# Patient Record
Sex: MALE | Race: OTHER | Employment: UNEMPLOYED | URBAN - METROPOLITAN AREA
[De-identification: names, ages, dates, MRNs, and addresses within clinical notes are randomized per-mention and may not be internally consistent; named-entity substitution may affect disease eponyms.]

---

## 2022-07-28 ENCOUNTER — ANESTHESIA EVENT (OUTPATIENT)
Dept: OPERATING ROOM | Age: 37
End: 2022-07-28

## 2022-07-28 ENCOUNTER — APPOINTMENT (OUTPATIENT)
Dept: CT IMAGING | Age: 37
End: 2022-07-28

## 2022-07-28 ENCOUNTER — ANESTHESIA (OUTPATIENT)
Dept: OPERATING ROOM | Age: 37
End: 2022-07-28

## 2022-07-28 ENCOUNTER — HOSPITAL ENCOUNTER (OUTPATIENT)
Age: 37
Setting detail: OBSERVATION
Discharge: HOME | End: 2022-07-29
Attending: SURGERY | Admitting: SURGERY

## 2022-07-28 DIAGNOSIS — K35.20 ACUTE APPENDICITIS WITH GENERALIZED PERITONITIS, WITHOUT GANGRENE OR ABSCESS, UNSPECIFIED WHETHER PERFORATION PRESENT: Primary | ICD-10-CM

## 2022-07-28 DIAGNOSIS — K37 APPENDICITIS, UNSPECIFIED APPENDICITIS TYPE: ICD-10-CM

## 2022-07-28 PROBLEM — K35.80 ACUTE APPENDICITIS: Status: ACTIVE | Noted: 2022-07-28

## 2022-07-28 LAB
A/G RATIO: 1.6 (ref 1.1–2.2)
ALBUMIN SERPL-MCNC: 4.9 G/DL (ref 3.4–5)
ALP BLD-CCNC: 105 U/L (ref 40–129)
ALT SERPL-CCNC: 30 U/L (ref 10–40)
ANION GAP SERPL CALCULATED.3IONS-SCNC: 9 MMOL/L (ref 3–16)
AST SERPL-CCNC: 23 U/L (ref 15–37)
BASOPHILS ABSOLUTE: 0.1 K/UL (ref 0–0.2)
BASOPHILS RELATIVE PERCENT: 0.5 %
BILIRUB SERPL-MCNC: 0.3 MG/DL (ref 0–1)
BILIRUBIN URINE: NEGATIVE
BLOOD, URINE: NEGATIVE
BUN BLDV-MCNC: 19 MG/DL (ref 7–20)
CALCIUM SERPL-MCNC: 9.7 MG/DL (ref 8.3–10.6)
CHLORIDE BLD-SCNC: 100 MMOL/L (ref 99–110)
CLARITY: CLEAR
CO2: 28 MMOL/L (ref 21–32)
COLOR: YELLOW
CREAT SERPL-MCNC: 1 MG/DL (ref 0.9–1.3)
EOSINOPHILS ABSOLUTE: 0 K/UL (ref 0–0.6)
EOSINOPHILS RELATIVE PERCENT: 0.3 %
GFR AFRICAN AMERICAN: >60
GFR NON-AFRICAN AMERICAN: >60
GLUCOSE BLD-MCNC: 143 MG/DL (ref 70–99)
GLUCOSE URINE: NEGATIVE MG/DL
HCT VFR BLD CALC: 44 % (ref 40.5–52.5)
HEMOGLOBIN: 14.7 G/DL (ref 13.5–17.5)
KETONES, URINE: NEGATIVE MG/DL
LEUKOCYTE ESTERASE, URINE: NEGATIVE
LIPASE: 21 U/L (ref 13–60)
LYMPHOCYTES ABSOLUTE: 1.8 K/UL (ref 1–5.1)
LYMPHOCYTES RELATIVE PERCENT: 10.5 %
MCH RBC QN AUTO: 30.7 PG (ref 26–34)
MCHC RBC AUTO-ENTMCNC: 33.5 G/DL (ref 31–36)
MCV RBC AUTO: 91.6 FL (ref 80–100)
MICROSCOPIC EXAMINATION: NORMAL
MONOCYTES ABSOLUTE: 0.9 K/UL (ref 0–1.3)
MONOCYTES RELATIVE PERCENT: 5.4 %
NEUTROPHILS ABSOLUTE: 14.6 K/UL (ref 1.7–7.7)
NEUTROPHILS RELATIVE PERCENT: 83.3 %
NITRITE, URINE: NEGATIVE
PDW BLD-RTO: 13.5 % (ref 12.4–15.4)
PH UA: 6.5 (ref 5–8)
PLATELET # BLD: 268 K/UL (ref 135–450)
PMV BLD AUTO: 7.6 FL (ref 5–10.5)
POTASSIUM SERPL-SCNC: 3.9 MMOL/L (ref 3.5–5.1)
PROTEIN UA: NEGATIVE MG/DL
RBC # BLD: 4.8 M/UL (ref 4.2–5.9)
SODIUM BLD-SCNC: 137 MMOL/L (ref 136–145)
SPECIFIC GRAVITY UA: >=1.03 (ref 1–1.03)
TOTAL PROTEIN: 7.9 G/DL (ref 6.4–8.2)
URINE REFLEX TO CULTURE: NORMAL
URINE TYPE: NORMAL
UROBILINOGEN, URINE: 0.2 E.U./DL
WBC # BLD: 17.5 K/UL (ref 4–11)

## 2022-07-28 PROCEDURE — 99285 EMERGENCY DEPT VISIT HI MDM: CPT

## 2022-07-28 PROCEDURE — 3700000001 HC ADD 15 MINUTES (ANESTHESIA): Performed by: SURGERY

## 2022-07-28 PROCEDURE — 96376 TX/PRO/DX INJ SAME DRUG ADON: CPT

## 2022-07-28 PROCEDURE — 2720000010 HC SURG SUPPLY STERILE: Performed by: SURGERY

## 2022-07-28 PROCEDURE — 99219 PR INITIAL OBSERVATION CARE/DAY 50 MINUTES: CPT | Performed by: SURGERY

## 2022-07-28 PROCEDURE — 7100000001 HC PACU RECOVERY - ADDTL 15 MIN: Performed by: SURGERY

## 2022-07-28 PROCEDURE — 3700000000 HC ANESTHESIA ATTENDED CARE: Performed by: SURGERY

## 2022-07-28 PROCEDURE — 6370000000 HC RX 637 (ALT 250 FOR IP): Performed by: PHYSICIAN ASSISTANT

## 2022-07-28 PROCEDURE — 6370000000 HC RX 637 (ALT 250 FOR IP): Performed by: SURGERY

## 2022-07-28 PROCEDURE — 80053 COMPREHEN METABOLIC PANEL: CPT

## 2022-07-28 PROCEDURE — 3600000014 HC SURGERY LEVEL 4 ADDTL 15MIN: Performed by: SURGERY

## 2022-07-28 PROCEDURE — 74177 CT ABD & PELVIS W/CONTRAST: CPT

## 2022-07-28 PROCEDURE — 96372 THER/PROPH/DIAG INJ SC/IM: CPT

## 2022-07-28 PROCEDURE — 6360000002 HC RX W HCPCS: Performed by: ANESTHESIOLOGY

## 2022-07-28 PROCEDURE — 6360000004 HC RX CONTRAST MEDICATION: Performed by: PHYSICIAN ASSISTANT

## 2022-07-28 PROCEDURE — A4216 STERILE WATER/SALINE, 10 ML: HCPCS | Performed by: SURGERY

## 2022-07-28 PROCEDURE — 6360000002 HC RX W HCPCS: Performed by: NURSE ANESTHETIST, CERTIFIED REGISTERED

## 2022-07-28 PROCEDURE — 2500000003 HC RX 250 WO HCPCS: Performed by: NURSE ANESTHETIST, CERTIFIED REGISTERED

## 2022-07-28 PROCEDURE — G0378 HOSPITAL OBSERVATION PER HR: HCPCS

## 2022-07-28 PROCEDURE — 6360000002 HC RX W HCPCS: Performed by: SURGERY

## 2022-07-28 PROCEDURE — APPNB30 APP NON BILLABLE TIME 0-30 MINS: Performed by: NURSE PRACTITIONER

## 2022-07-28 PROCEDURE — 7100000000 HC PACU RECOVERY - FIRST 15 MIN: Performed by: SURGERY

## 2022-07-28 PROCEDURE — APPSS60 APP SPLIT SHARED TIME 46-60 MINUTES: Performed by: NURSE PRACTITIONER

## 2022-07-28 PROCEDURE — 81003 URINALYSIS AUTO W/O SCOPE: CPT

## 2022-07-28 PROCEDURE — 83690 ASSAY OF LIPASE: CPT

## 2022-07-28 PROCEDURE — 96375 TX/PRO/DX INJ NEW DRUG ADDON: CPT

## 2022-07-28 PROCEDURE — 85025 COMPLETE CBC W/AUTO DIFF WBC: CPT

## 2022-07-28 PROCEDURE — 36415 COLL VENOUS BLD VENIPUNCTURE: CPT

## 2022-07-28 PROCEDURE — 2500000003 HC RX 250 WO HCPCS: Performed by: SURGERY

## 2022-07-28 PROCEDURE — 2580000003 HC RX 258: Performed by: SURGERY

## 2022-07-28 PROCEDURE — 96374 THER/PROPH/DIAG INJ IV PUSH: CPT

## 2022-07-28 PROCEDURE — 88304 TISSUE EXAM BY PATHOLOGIST: CPT

## 2022-07-28 PROCEDURE — 2709999900 HC NON-CHARGEABLE SUPPLY: Performed by: SURGERY

## 2022-07-28 PROCEDURE — 44970 LAPAROSCOPY APPENDECTOMY: CPT | Performed by: SURGERY

## 2022-07-28 PROCEDURE — C9113 INJ PANTOPRAZOLE SODIUM, VIA: HCPCS | Performed by: SURGERY

## 2022-07-28 PROCEDURE — 6360000002 HC RX W HCPCS: Performed by: PHYSICIAN ASSISTANT

## 2022-07-28 PROCEDURE — A4217 STERILE WATER/SALINE, 500 ML: HCPCS | Performed by: SURGERY

## 2022-07-28 PROCEDURE — 3600000004 HC SURGERY LEVEL 4 BASE: Performed by: SURGERY

## 2022-07-28 PROCEDURE — 2580000003 HC RX 258: Performed by: PHYSICIAN ASSISTANT

## 2022-07-28 RX ORDER — SODIUM CHLORIDE 9 MG/ML
INJECTION, SOLUTION INTRAVENOUS CONTINUOUS
Status: DISCONTINUED | OUTPATIENT
Start: 2022-07-28 | End: 2022-07-29 | Stop reason: HOSPADM

## 2022-07-28 RX ORDER — MORPHINE SULFATE 4 MG/ML
4 INJECTION, SOLUTION INTRAMUSCULAR; INTRAVENOUS
Status: DISCONTINUED | OUTPATIENT
Start: 2022-07-28 | End: 2022-07-29 | Stop reason: HOSPADM

## 2022-07-28 RX ORDER — MAGNESIUM HYDROXIDE 1200 MG/15ML
LIQUID ORAL CONTINUOUS PRN
Status: COMPLETED | OUTPATIENT
Start: 2022-07-28 | End: 2022-07-28

## 2022-07-28 RX ORDER — SODIUM CHLORIDE 0.9 % (FLUSH) 0.9 %
5-40 SYRINGE (ML) INJECTION EVERY 12 HOURS SCHEDULED
Status: DISCONTINUED | OUTPATIENT
Start: 2022-07-28 | End: 2022-07-29 | Stop reason: HOSPADM

## 2022-07-28 RX ORDER — LIDOCAINE HYDROCHLORIDE 20 MG/ML
INJECTION, SOLUTION EPIDURAL; INFILTRATION; INTRACAUDAL; PERINEURAL PRN
Status: DISCONTINUED | OUTPATIENT
Start: 2022-07-28 | End: 2022-07-28 | Stop reason: SDUPTHER

## 2022-07-28 RX ORDER — OXYCODONE HYDROCHLORIDE 5 MG/1
10 TABLET ORAL EVERY 4 HOURS PRN
Status: DISCONTINUED | OUTPATIENT
Start: 2022-07-28 | End: 2022-07-29 | Stop reason: HOSPADM

## 2022-07-28 RX ORDER — SODIUM CHLORIDE 0.9 % (FLUSH) 0.9 %
5-40 SYRINGE (ML) INJECTION PRN
Status: DISCONTINUED | OUTPATIENT
Start: 2022-07-28 | End: 2022-07-29 | Stop reason: HOSPADM

## 2022-07-28 RX ORDER — ONDANSETRON 4 MG/1
4 TABLET, ORALLY DISINTEGRATING ORAL EVERY 8 HOURS PRN
Status: DISCONTINUED | OUTPATIENT
Start: 2022-07-28 | End: 2022-07-29 | Stop reason: HOSPADM

## 2022-07-28 RX ORDER — DEXAMETHASONE SODIUM PHOSPHATE 4 MG/ML
INJECTION, SOLUTION INTRA-ARTICULAR; INTRALESIONAL; INTRAMUSCULAR; INTRAVENOUS; SOFT TISSUE PRN
Status: DISCONTINUED | OUTPATIENT
Start: 2022-07-28 | End: 2022-07-28 | Stop reason: SDUPTHER

## 2022-07-28 RX ORDER — ENOXAPARIN SODIUM 100 MG/ML
40 INJECTION SUBCUTANEOUS DAILY
Status: DISCONTINUED | OUTPATIENT
Start: 2022-07-29 | End: 2022-07-29 | Stop reason: HOSPADM

## 2022-07-28 RX ORDER — OXYCODONE HYDROCHLORIDE 5 MG/1
5 TABLET ORAL EVERY 4 HOURS PRN
Status: DISCONTINUED | OUTPATIENT
Start: 2022-07-28 | End: 2022-07-29 | Stop reason: HOSPADM

## 2022-07-28 RX ORDER — HYDROMORPHONE HCL 110MG/55ML
0.5 PATIENT CONTROLLED ANALGESIA SYRINGE INTRAVENOUS EVERY 5 MIN PRN
Status: DISCONTINUED | OUTPATIENT
Start: 2022-07-28 | End: 2022-07-28 | Stop reason: HOSPADM

## 2022-07-28 RX ORDER — SODIUM CHLORIDE 0.9 % (FLUSH) 0.9 %
5-40 SYRINGE (ML) INJECTION PRN
Status: DISCONTINUED | OUTPATIENT
Start: 2022-07-28 | End: 2022-07-28 | Stop reason: HOSPADM

## 2022-07-28 RX ORDER — SODIUM CHLORIDE 9 MG/ML
INJECTION, SOLUTION INTRAVENOUS PRN
Status: DISCONTINUED | OUTPATIENT
Start: 2022-07-28 | End: 2022-07-29 | Stop reason: HOSPADM

## 2022-07-28 RX ORDER — DIPHENHYDRAMINE HYDROCHLORIDE 50 MG/ML
12.5 INJECTION INTRAMUSCULAR; INTRAVENOUS
Status: DISCONTINUED | OUTPATIENT
Start: 2022-07-28 | End: 2022-07-28 | Stop reason: HOSPADM

## 2022-07-28 RX ORDER — OXYCODONE HYDROCHLORIDE 5 MG/1
5 TABLET ORAL EVERY 4 HOURS PRN
Status: DISCONTINUED | OUTPATIENT
Start: 2022-07-28 | End: 2022-07-28 | Stop reason: SDUPTHER

## 2022-07-28 RX ORDER — KETOROLAC TROMETHAMINE 30 MG/ML
INJECTION, SOLUTION INTRAMUSCULAR; INTRAVENOUS PRN
Status: DISCONTINUED | OUTPATIENT
Start: 2022-07-28 | End: 2022-07-28 | Stop reason: SDUPTHER

## 2022-07-28 RX ORDER — ENOXAPARIN SODIUM 100 MG/ML
40 INJECTION SUBCUTANEOUS DAILY
Status: DISCONTINUED | OUTPATIENT
Start: 2022-07-28 | End: 2022-07-28

## 2022-07-28 RX ORDER — SODIUM CHLORIDE 0.9 % (FLUSH) 0.9 %
5-40 SYRINGE (ML) INJECTION EVERY 12 HOURS SCHEDULED
Status: DISCONTINUED | OUTPATIENT
Start: 2022-07-28 | End: 2022-07-28 | Stop reason: HOSPADM

## 2022-07-28 RX ORDER — BUPIVACAINE HYDROCHLORIDE AND EPINEPHRINE 5; 5 MG/ML; UG/ML
INJECTION, SOLUTION EPIDURAL; INTRACAUDAL; PERINEURAL
Status: COMPLETED | OUTPATIENT
Start: 2022-07-28 | End: 2022-07-28

## 2022-07-28 RX ORDER — HYDROCODONE BITARTRATE AND ACETAMINOPHEN 5; 325 MG/1; MG/1
2 TABLET ORAL ONCE
Status: COMPLETED | OUTPATIENT
Start: 2022-07-28 | End: 2022-07-28

## 2022-07-28 RX ORDER — KETOROLAC TROMETHAMINE 30 MG/ML
30 INJECTION, SOLUTION INTRAMUSCULAR; INTRAVENOUS EVERY 6 HOURS
Status: DISCONTINUED | OUTPATIENT
Start: 2022-07-28 | End: 2022-07-29 | Stop reason: HOSPADM

## 2022-07-28 RX ORDER — OMEPRAZOLE 10 MG/1
10 CAPSULE, DELAYED RELEASE ORAL DAILY
COMMUNITY

## 2022-07-28 RX ORDER — KETAMINE HCL IN NACL, ISO-OSM 100MG/10ML
SYRINGE (ML) INJECTION PRN
Status: DISCONTINUED | OUTPATIENT
Start: 2022-07-28 | End: 2022-07-28 | Stop reason: SDUPTHER

## 2022-07-28 RX ORDER — ONDANSETRON 2 MG/ML
INJECTION INTRAMUSCULAR; INTRAVENOUS PRN
Status: DISCONTINUED | OUTPATIENT
Start: 2022-07-28 | End: 2022-07-28 | Stop reason: SDUPTHER

## 2022-07-28 RX ORDER — MORPHINE SULFATE 2 MG/ML
2 INJECTION, SOLUTION INTRAMUSCULAR; INTRAVENOUS
Status: DISCONTINUED | OUTPATIENT
Start: 2022-07-28 | End: 2022-07-29 | Stop reason: HOSPADM

## 2022-07-28 RX ORDER — ONDANSETRON 2 MG/ML
4 INJECTION INTRAMUSCULAR; INTRAVENOUS EVERY 6 HOURS PRN
Status: DISCONTINUED | OUTPATIENT
Start: 2022-07-28 | End: 2022-07-28

## 2022-07-28 RX ORDER — PROPOFOL 10 MG/ML
INJECTION, EMULSION INTRAVENOUS PRN
Status: DISCONTINUED | OUTPATIENT
Start: 2022-07-28 | End: 2022-07-28 | Stop reason: SDUPTHER

## 2022-07-28 RX ORDER — ACETAMINOPHEN 325 MG/1
650 TABLET ORAL EVERY 6 HOURS
Status: DISCONTINUED | OUTPATIENT
Start: 2022-07-28 | End: 2022-07-29 | Stop reason: HOSPADM

## 2022-07-28 RX ORDER — ONDANSETRON 4 MG/1
4 TABLET, ORALLY DISINTEGRATING ORAL EVERY 8 HOURS PRN
Status: DISCONTINUED | OUTPATIENT
Start: 2022-07-28 | End: 2022-07-28

## 2022-07-28 RX ORDER — 0.9 % SODIUM CHLORIDE 0.9 %
1000 INTRAVENOUS SOLUTION INTRAVENOUS ONCE
Status: COMPLETED | OUTPATIENT
Start: 2022-07-28 | End: 2022-07-28

## 2022-07-28 RX ORDER — ONDANSETRON 2 MG/ML
4 INJECTION INTRAMUSCULAR; INTRAVENOUS EVERY 6 HOURS PRN
Status: DISCONTINUED | OUTPATIENT
Start: 2022-07-28 | End: 2022-07-28 | Stop reason: SDUPTHER

## 2022-07-28 RX ORDER — MORPHINE SULFATE 4 MG/ML
4 INJECTION, SOLUTION INTRAMUSCULAR; INTRAVENOUS EVERY 30 MIN PRN
Status: DISCONTINUED | OUTPATIENT
Start: 2022-07-28 | End: 2022-07-28 | Stop reason: DRUGHIGH

## 2022-07-28 RX ORDER — SODIUM CHLORIDE 9 MG/ML
INJECTION, SOLUTION INTRAVENOUS CONTINUOUS
Status: DISCONTINUED | OUTPATIENT
Start: 2022-07-28 | End: 2022-07-28

## 2022-07-28 RX ORDER — ONDANSETRON 2 MG/ML
4 INJECTION INTRAMUSCULAR; INTRAVENOUS
Status: DISCONTINUED | OUTPATIENT
Start: 2022-07-28 | End: 2022-07-28 | Stop reason: HOSPADM

## 2022-07-28 RX ORDER — MORPHINE SULFATE 2 MG/ML
2 INJECTION, SOLUTION INTRAMUSCULAR; INTRAVENOUS
Status: DISCONTINUED | OUTPATIENT
Start: 2022-07-28 | End: 2022-07-28 | Stop reason: SDUPTHER

## 2022-07-28 RX ORDER — HYDROMORPHONE HCL 110MG/55ML
0.25 PATIENT CONTROLLED ANALGESIA SYRINGE INTRAVENOUS EVERY 5 MIN PRN
Status: DISCONTINUED | OUTPATIENT
Start: 2022-07-28 | End: 2022-07-28 | Stop reason: HOSPADM

## 2022-07-28 RX ORDER — SUCCINYLCHOLINE/SOD CL,ISO/PF 200MG/10ML
SYRINGE (ML) INTRAVENOUS PRN
Status: DISCONTINUED | OUTPATIENT
Start: 2022-07-28 | End: 2022-07-28 | Stop reason: SDUPTHER

## 2022-07-28 RX ORDER — MORPHINE SULFATE 4 MG/ML
4 INJECTION, SOLUTION INTRAMUSCULAR; INTRAVENOUS
Status: DISCONTINUED | OUTPATIENT
Start: 2022-07-28 | End: 2022-07-28 | Stop reason: SDUPTHER

## 2022-07-28 RX ORDER — ONDANSETRON 2 MG/ML
4 INJECTION INTRAMUSCULAR; INTRAVENOUS EVERY 6 HOURS PRN
Status: DISCONTINUED | OUTPATIENT
Start: 2022-07-28 | End: 2022-07-29 | Stop reason: HOSPADM

## 2022-07-28 RX ORDER — ROCURONIUM BROMIDE 10 MG/ML
INJECTION, SOLUTION INTRAVENOUS PRN
Status: DISCONTINUED | OUTPATIENT
Start: 2022-07-28 | End: 2022-07-28 | Stop reason: SDUPTHER

## 2022-07-28 RX ORDER — SODIUM CHLORIDE 9 MG/ML
INJECTION, SOLUTION INTRAVENOUS PRN
Status: DISCONTINUED | OUTPATIENT
Start: 2022-07-28 | End: 2022-07-28 | Stop reason: HOSPADM

## 2022-07-28 RX ORDER — MEPERIDINE HYDROCHLORIDE 25 MG/ML
12.5 INJECTION INTRAMUSCULAR; INTRAVENOUS; SUBCUTANEOUS EVERY 5 MIN PRN
Status: DISCONTINUED | OUTPATIENT
Start: 2022-07-28 | End: 2022-07-28 | Stop reason: HOSPADM

## 2022-07-28 RX ORDER — FENTANYL CITRATE 50 UG/ML
INJECTION, SOLUTION INTRAMUSCULAR; INTRAVENOUS PRN
Status: DISCONTINUED | OUTPATIENT
Start: 2022-07-28 | End: 2022-07-28 | Stop reason: SDUPTHER

## 2022-07-28 RX ORDER — OXYCODONE HYDROCHLORIDE 5 MG/1
10 TABLET ORAL EVERY 4 HOURS PRN
Status: DISCONTINUED | OUTPATIENT
Start: 2022-07-28 | End: 2022-07-28 | Stop reason: SDUPTHER

## 2022-07-28 RX ORDER — KETOROLAC TROMETHAMINE 30 MG/ML
30 INJECTION, SOLUTION INTRAMUSCULAR; INTRAVENOUS ONCE
Status: COMPLETED | OUTPATIENT
Start: 2022-07-28 | End: 2022-07-28

## 2022-07-28 RX ORDER — ACETAMINOPHEN 325 MG/1
650 TABLET ORAL EVERY 4 HOURS PRN
Status: DISCONTINUED | OUTPATIENT
Start: 2022-07-28 | End: 2022-07-29 | Stop reason: HOSPADM

## 2022-07-28 RX ORDER — MIDAZOLAM HYDROCHLORIDE 1 MG/ML
INJECTION INTRAMUSCULAR; INTRAVENOUS PRN
Status: DISCONTINUED | OUTPATIENT
Start: 2022-07-28 | End: 2022-07-28 | Stop reason: SDUPTHER

## 2022-07-28 RX ADMIN — Medication 10 ML: at 20:30

## 2022-07-28 RX ADMIN — PIPERACILLIN AND TAZOBACTAM 3375 MG: 3; .375 INJECTION, POWDER, FOR SOLUTION INTRAVENOUS at 20:30

## 2022-07-28 RX ADMIN — SODIUM CHLORIDE: 9 INJECTION, SOLUTION INTRAVENOUS at 17:00

## 2022-07-28 RX ADMIN — SODIUM CHLORIDE: 9 INJECTION, SOLUTION INTRAVENOUS at 06:16

## 2022-07-28 RX ADMIN — SODIUM CHLORIDE 1000 ML: 9 INJECTION, SOLUTION INTRAVENOUS at 02:40

## 2022-07-28 RX ADMIN — ONDANSETRON 4 MG: 2 INJECTION INTRAMUSCULAR; INTRAVENOUS at 02:34

## 2022-07-28 RX ADMIN — LIDOCAINE HYDROCHLORIDE 50 MG: 20 INJECTION, SOLUTION EPIDURAL; INFILTRATION; INTRACAUDAL; PERINEURAL at 13:06

## 2022-07-28 RX ADMIN — HYDROCODONE BITARTRATE AND ACETAMINOPHEN 2 TABLET: 5; 325 TABLET ORAL at 02:39

## 2022-07-28 RX ADMIN — KETOROLAC TROMETHAMINE 30 MG: 30 INJECTION, SOLUTION INTRAMUSCULAR at 21:56

## 2022-07-28 RX ADMIN — SODIUM CHLORIDE 40 MG: 9 INJECTION, SOLUTION INTRAMUSCULAR; INTRAVENOUS; SUBCUTANEOUS at 08:59

## 2022-07-28 RX ADMIN — Medication 30 MG: at 13:04

## 2022-07-28 RX ADMIN — MORPHINE SULFATE 4 MG: 4 INJECTION, SOLUTION INTRAMUSCULAR; INTRAVENOUS at 06:19

## 2022-07-28 RX ADMIN — ROCURONIUM BROMIDE 5 MG: 10 INJECTION, SOLUTION INTRAVENOUS at 13:06

## 2022-07-28 RX ADMIN — HYDROMORPHONE HYDROCHLORIDE 0.5 MG: 2 INJECTION, SOLUTION INTRAMUSCULAR; INTRAVENOUS; SUBCUTANEOUS at 14:47

## 2022-07-28 RX ADMIN — ACETAMINOPHEN 325MG 650 MG: 325 TABLET ORAL at 21:56

## 2022-07-28 RX ADMIN — PIPERACILLIN AND TAZOBACTAM 3375 MG: 3; .375 INJECTION, POWDER, FOR SOLUTION INTRAVENOUS at 04:36

## 2022-07-28 RX ADMIN — ROCURONIUM BROMIDE 25 MG: 10 INJECTION, SOLUTION INTRAVENOUS at 13:13

## 2022-07-28 RX ADMIN — SUGAMMADEX 150 MG: 100 INJECTION, SOLUTION INTRAVENOUS at 13:38

## 2022-07-28 RX ADMIN — ACETAMINOPHEN 325MG 650 MG: 325 TABLET ORAL at 16:47

## 2022-07-28 RX ADMIN — Medication 10 ML: at 21:56

## 2022-07-28 RX ADMIN — KETOROLAC TROMETHAMINE 15 MG: 30 INJECTION, SOLUTION INTRAMUSCULAR; INTRAVENOUS at 13:35

## 2022-07-28 RX ADMIN — DEXAMETHASONE SODIUM PHOSPHATE 4 MG: 4 INJECTION, SOLUTION INTRAMUSCULAR; INTRAVENOUS at 13:18

## 2022-07-28 RX ADMIN — Medication 120 MG: at 13:06

## 2022-07-28 RX ADMIN — HYDROMORPHONE HYDROCHLORIDE 0.5 MG: 2 INJECTION, SOLUTION INTRAMUSCULAR; INTRAVENOUS; SUBCUTANEOUS at 14:31

## 2022-07-28 RX ADMIN — FENTANYL CITRATE 50 MCG: 50 INJECTION, SOLUTION INTRAMUSCULAR; INTRAVENOUS at 13:04

## 2022-07-28 RX ADMIN — SODIUM CHLORIDE 1000 ML: 9 INJECTION, SOLUTION INTRAVENOUS at 04:29

## 2022-07-28 RX ADMIN — PROPOFOL 150 MG: 10 INJECTION, EMULSION INTRAVENOUS at 13:06

## 2022-07-28 RX ADMIN — Medication 10 ML: at 09:00

## 2022-07-28 RX ADMIN — MORPHINE SULFATE 4 MG: 4 INJECTION INTRAVENOUS at 04:32

## 2022-07-28 RX ADMIN — FENTANYL CITRATE 50 MCG: 50 INJECTION, SOLUTION INTRAMUSCULAR; INTRAVENOUS at 13:40

## 2022-07-28 RX ADMIN — MIDAZOLAM 2 MG: 1 INJECTION INTRAMUSCULAR; INTRAVENOUS at 13:02

## 2022-07-28 RX ADMIN — ENOXAPARIN SODIUM 40 MG: 100 INJECTION SUBCUTANEOUS at 08:58

## 2022-07-28 RX ADMIN — ONDANSETRON 4 MG: 2 INJECTION INTRAMUSCULAR; INTRAVENOUS at 13:18

## 2022-07-28 RX ADMIN — HYDROMORPHONE HYDROCHLORIDE 0.5 MG: 2 INJECTION, SOLUTION INTRAMUSCULAR; INTRAVENOUS; SUBCUTANEOUS at 14:26

## 2022-07-28 RX ADMIN — KETOROLAC TROMETHAMINE 30 MG: 30 INJECTION, SOLUTION INTRAMUSCULAR at 16:48

## 2022-07-28 RX ADMIN — KETOROLAC TROMETHAMINE 30 MG: 30 INJECTION, SOLUTION INTRAMUSCULAR at 02:33

## 2022-07-28 RX ADMIN — SODIUM CHLORIDE, PRESERVATIVE FREE 10 ML: 5 INJECTION INTRAVENOUS at 16:49

## 2022-07-28 RX ADMIN — IOPAMIDOL 75 ML: 755 INJECTION, SOLUTION INTRAVENOUS at 03:07

## 2022-07-28 RX ADMIN — PIPERACILLIN AND TAZOBACTAM 3375 MG: 3; .375 INJECTION, POWDER, FOR SOLUTION INTRAVENOUS at 13:00

## 2022-07-28 ASSESSMENT — PAIN SCALES - GENERAL
PAINLEVEL_OUTOF10: 7
PAINLEVEL_OUTOF10: 4
PAINLEVEL_OUTOF10: 7
PAINLEVEL_OUTOF10: 8
PAINLEVEL_OUTOF10: 10
PAINLEVEL_OUTOF10: 10
PAINLEVEL_OUTOF10: 4
PAINLEVEL_OUTOF10: 7
PAINLEVEL_OUTOF10: 8

## 2022-07-28 ASSESSMENT — ENCOUNTER SYMPTOMS
ABDOMINAL PAIN: 1
RHINORRHEA: 0
COUGH: 0
SHORTNESS OF BREATH: 0
DIARRHEA: 0
VOMITING: 1
NAUSEA: 1

## 2022-07-28 ASSESSMENT — PAIN DESCRIPTION - LOCATION
LOCATION: ABDOMEN

## 2022-07-28 ASSESSMENT — PAIN DESCRIPTION - ORIENTATION: ORIENTATION: LEFT;LOWER

## 2022-07-28 ASSESSMENT — PAIN - FUNCTIONAL ASSESSMENT: PAIN_FUNCTIONAL_ASSESSMENT: 0-10

## 2022-07-28 NOTE — PROGRESS NOTES
Pt resting quietly in bed with eyes closed, awakens to voice. VSS, O2 sats 97% on 3 L NC. Incisions to abdomen dry and intact, abdomen soft, ice pack remains in place. Pt seen by anesthesia, will transfer pt to ICU.

## 2022-07-28 NOTE — ANESTHESIA POSTPROCEDURE EVALUATION
Department of Anesthesiology  Postprocedure Note    Patient: Yoseph Dubose  MRN: [id number]  YOB: 1985  Date of evaluation: 7/28/2022      Procedure Summary     Date: 07/28/22 Room / Location: [address]    Anesthesia Start: 1302 Anesthesia Stop: 7002    Procedure: LAPAROSCOPIC  APPENDECTOMY (Abdomen) Diagnosis:       Appendicitis, unspecified appendicitis type      (Appendicitis)    Surgeons: Deyanira Gray MD Responsible Provider: Chasity Fry MD    Anesthesia Type: general ASA Status: 1          Anesthesia Type: No value filed.     Prieto Phase I: Prieto Score: 7    Prieto Phase II:        Anesthesia Post Evaluation    Patient location during evaluation: PACU  Patient participation: complete - patient participated  Level of consciousness: awake and alert  Pain score: 2  Airway patency: patent  Nausea & Vomiting: no vomiting  Complications: no  Cardiovascular status: blood pressure returned to baseline  Respiratory status: acceptable  Hydration status: euvolemic  Multimodal analgesia pain management approach

## 2022-07-28 NOTE — ED PROVIDER NOTES
[facility]        Pt Name: Geoffrey Sheikh  MRN: [id number]  [place] 1985  Date of evaluation: 7/28/2022  Provider: Deondre Vargas PA-C  PCP: No primary care provider on file. Note Started: 2:30 AM EDT       JANNETTE. I have evaluated this patient. My supervising physician was available for consultation. CHIEF COMPLAINT       Chief Complaint   Patient presents with    Abdominal Pain     Pt states that his abdominal pain started today around 2pm. Pt states that he has nausea and vomiting. Pt states no constipation or diarrhea. Pt states pain feels like a achy pain from belly button down        HISTORY OF PRESENT ILLNESS   (Location, Timing/Onset, Context/Setting, Quality, Duration, Modifying Factors, Severity, Associated Signs and Symptoms)  Note limiting factors. The patient's preferred language is Kyrgyz, language lines are used for interpretation    Chief Complaint: Abdominal pain    Geoffrey Sheikh is a 40 y.o. male who presents to the emergency department today for evaluation for abdominal pain. The patient states that he started with left lower quadrant abdominal pain around 2 PM.  The patient states that he ate at [facility], and believes that some of the food was uncooked, and feels that this could be the source of his symptoms. Patient is complaining of pain to his left lower abdomen. States that his pain is sharp, constant is currently rated as a 10/10. He is nauseous, and has had several episodes of emesis. Patient denies any bilious emesis, hematemesis or coffee-ground emesis. Patient denies any fever or chills. He has no diarrhea he has no chest pain or shortness of breath. He has no cough or congestion. Patient denies any past surgical history to the abdomen. He has no urinary symptoms.   He states he has never had symptoms like this in the past, no other complaints    Nursing Notes were all reviewed and agreed with or any disagreements were addressed in the HPI. REVIEW OF SYSTEMS    (2-9 systems for level 4, 10 or more for level 5)     Review of Systems   Constitutional:  Negative for activity change, appetite change, chills and fever. HENT:  Negative for congestion and rhinorrhea. Respiratory:  Negative for cough and shortness of breath. Cardiovascular:  Negative for chest pain. Gastrointestinal:  Positive for abdominal pain, nausea and vomiting. Negative for diarrhea. Genitourinary:  Negative for difficulty urinating, dysuria and hematuria. Positives and Pertinent negatives as per HPI. Except as noted above in the ROS, all other systems were reviewed and negative. PAST MEDICAL HISTORY   No past medical history on file. SURGICAL HISTORY   No past surgical history on file. CURRENTMEDICATIONS       Previous Medications    OMEPRAZOLE (PRILOSEC) 10 MG DELAYED RELEASE CAPSULE    Take 10 mg by mouth in the morning. ALLERGIES     Patient has no known allergies. FAMILYHISTORY     No family history on file. SOCIAL HISTORY          SCREENINGS    [place] Coma Scale  Eye Opening: Spontaneous  Best Verbal Response: Oriented  Best Motor Response: Obeys commands  Julio César Coma Scale Score: 15        PHYSICAL EXAM    (up to 7 for level 4, 8 or more for level 5)     ED Triage Vitals   BP Temp Temp Source Heart Rate Resp SpO2 Height Weight   07/28/22 0154 07/28/22 0151 07/28/22 0151 07/28/22 0151 07/28/22 0151 07/28/22 0151 -- --   (!) 148/86 98.1 °F (36.7 °C) Oral 67 18 96 %         Physical Exam  Vitals and nursing note reviewed. Constitutional:       Appearance: He is well-developed. He is not diaphoretic. HENT:      Head: Normocephalic and atraumatic. Right Ear: External ear normal.      Left Ear: External ear normal.      Nose: Nose normal.   Eyes:      General:         Right eye: No discharge. Left eye: No discharge.    Neck:      Trachea: No tracheal deviation. Cardiovascular:      Rate and Rhythm: Normal rate and regular rhythm. Heart sounds: No murmur heard. Pulmonary:      Effort: Pulmonary effort is normal. No respiratory distress. Breath sounds: Normal breath sounds. No wheezing or rales. Abdominal:      General: Bowel sounds are normal. There is no distension. Palpations: Abdomen is soft. Tenderness: There is abdominal tenderness in the right lower quadrant, suprapubic area and left lower quadrant. There is guarding (Voluntary). There is no right CVA tenderness, left CVA tenderness or rebound. Musculoskeletal:         General: Normal range of motion. Cervical back: Normal range of motion and neck supple. Skin:     General: Skin is warm and dry. Neurological:      Mental Status: He is alert and oriented to person, place, and time. Psychiatric:         Behavior: Behavior normal.       DIAGNOSTIC RESULTS   LABS:    Labs Reviewed   CBC WITH AUTO DIFFERENTIAL - Abnormal; Notable for the following components:       Result Value    WBC 17.5 (*)     Neutrophils Absolute 14.6 (*)     All other components within normal limits   COMPREHENSIVE METABOLIC PANEL - Abnormal; Notable for the following components:    Glucose 143 (*)     All other components within normal limits   LIPASE   URINALYSIS WITH REFLEX TO CULTURE       When ordered only abnormal lab results are displayed. All other labs were within normal range or not returned as of this dictation. EKG: When ordered, EKG's are interpreted by the Emergency Department Physician in the absence of a cardiologist.  Please see their note for interpretation of EKG.     RADIOLOGY:   Non-plain film images such as CT, Ultrasound and MRI are read by the radiologist. Plain radiographic images are visualized and preliminarily interpreted by the ED Provider with the below findings:        Interpretation per the Radiologist below, if available at the time of this note:    CT ABDOMEN PELVIS W IV CONTRAST Additional Contrast? None   Final Result   Enlarged appendix measuring 15 mm with appendicoliths, notably at the   orifice. While there is minimal surrounding inflammatory change, this likely   represents early acute appendicitis. No results found. PROCEDURES   Unless otherwise noted below, none     Procedures    CRITICAL CARE TIME       CONSULTS:  None      EMERGENCY DEPARTMENT COURSE and DIFFERENTIAL DIAGNOSIS/MDM:   Vitals:    Vitals:    07/28/22 0151 07/28/22 0154   BP:  (!) 148/86   Pulse: 67    Resp: 18    Temp: 98.1 °F (36.7 °C)    TempSrc: Oral    SpO2: 96%        Patient was given the following medications:  Medications   ondansetron (ZOFRAN) injection 4 mg (4 mg IntraVENous Given 7/28/22 0234)   piperacillin-tazobactam (ZOSYN) 3,375 mg in dextrose 5 % 50 mL IVPB (mini-bag) (has no administration in time range)   0.9 % sodium chloride bolus (has no administration in time range)   morphine injection 4 mg (has no administration in time range)   0.9 % sodium chloride bolus (1,000 mLs IntraVENous New Bag 7/28/22 0240)   ketorolac (TORADOL) injection 30 mg (30 mg IntraVENous Given 7/28/22 0233)   HYDROcodone-acetaminophen (NORCO) 5-325 MG per tablet 2 tablet (2 tablets Oral Given 7/28/22 0239)   iopamidol (ISOVUE-370) 76 % injection 75 mL (75 mLs IntraVENous Given 7/28/22 0307)         Is this patient to be included in the SEP-1 Core Measure due to severe sepsis or septic shock? No   Exclusion criteria - the patient is NOT to be included for SEP-1 Core Measure due to:  2+ SIRS criteria are not met    Briefly, this is a 80-year-old male who presents to the emergency department today for evaluation for left lower quadrant abdominal pain which began today around 2 PM.  Associated nausea, vomiting. On examination he does have tenderness  noted across the lower abdomen with voluntary guarding    CBC shows leukocytosis of 17.5 no evidence of anemia. CMP is unremarkable.   Lipase is normal.  CT shows an enlarged appendix measuring 15 mm with appendicolith. Likely early appendicitis    I did discuss the case with general surgery, agreeable for plan for admission, they will admit the patient, patient will be given Zosyn, fluids and pain medication. Although he states that he is feeling better. Patient is updated, agreeable with plan, stable for admission. FINAL IMPRESSION      1. Acute appendicitis with generalized peritonitis, without gangrene or abscess, unspecified whether perforation present          DISPOSITION/PLAN   DISPOSITION Decision To Admit 07/28/2022 03:38:07 AM      PATIENT REFERRED TO:  No follow-up provider specified.     DISCHARGE MEDICATIONS:  New Prescriptions    No medications on file       DISCONTINUED MEDICATIONS:  Discontinued Medications    No medications on file              (Please note that portions of this note were completed with a voice recognition program.  Efforts were made to edit the dictations but occasionally words are mis-transcribed.)    Toni Mcdonald PA-C (electronically signed)            Toni Mcdonald PA-C  07/28/22 0291

## 2022-07-28 NOTE — BRIEF OP NOTE
[address] [facility] and Laparoscopic Surgery  Brief Operative Note  Pt Name: Diana Eddy  CSN: [id number]  YOB: 1985    Date of Procedure: 7/28/2022    Pre-operative Diagnosis: Acute appendicitis    Post-operative Diagnosis: same     Procedure: Laparoscopic appendectomy    Surgeon(s):  Yara Santiago MD     Surgical Assistant: Shayna Diaz    Anesthesia:  General anesthesia    Findings: Full note dictated, Dictation Job Number: [id number]    Estimated Blood Loss: less than 50  ml    Complications: None    Specimens: appendix  ID Type Source Tests Collected by Time Destination   A : A) APPENDIX Tissue Appendix SURGICAL PATHOLOGY Yara Santiago MD 7/28/2022 1336       Implants:  * No implants in log *    Drains: none   Urinary Catheter 2 Way (Active)       Condition: stable     Disposition and Post-operative plan: PACU, med/surg lovelace     Mitch Reno MD, FACS  7/28/2022  1:38 PM

## 2022-07-28 NOTE — H&P
[facility]        History and Physical    Patient Name: Charisse Walter  MRN: [id number]  [place]: 1985  Admission Date: 7/28/2022  1:46 AM   Date of Evaluation: 7/28/2022  Primary Care Physician: No primary care provider on file. Chief Complaint: Abdominal pain    SUBJECTIVE    History of Present Illness:  Mr. Cheryl Hernandes is a 40 y.o. male who presented to the ED early this morning with severe, sharp abdominal pain, just below the umbilicus, the pain does not radiate, started suddenly yesterday around 14:00 and has been constant since onset. No alleviating or aggravating factors noted. Associated nausea, vomiting, constipation, and SOB with pain. He denies fevers, chills, diarrhea, hematochezia, melena, urinary symptoms, or chest pain. He reports pain that felt similar but less severe about 4 months ago, and saw a doctor in [place] who prescribed omeprazole and symptoms improved. No significant medical history. No prior surgeries. No blood thinners. Nonsmoker     phone was used ([place] to [place]) in order to obtain history, assist with physical exam, explain results and medical decision making, plan for treatment/follow-up, and answer all questions and concerns.  #[id number] was used. Past Medical History:  No past medical history on file. Past Surgical History:  No past surgical history on file.     Scheduled Meds:   sodium chloride flush  5-40 mL IntraVENous 2 times per day    enoxaparin  40 mg SubCUTAneous Daily    pantoprazole (PROTONIX) 40 mg injection  40 mg IntraVENous Daily    piperacillin-tazobactam  3,375 mg IntraVENous Q8H     Continuous Infusions:   sodium chloride      sodium chloride 125 mL/hr at 07/28/22 0616     PRN Meds:.sodium chloride flush, sodium chloride, ondansetron **OR** ondansetron, morphine **OR** morphine, oxyCODONE **OR** oxyCODONE, acetaminophen    Prior to Admission medications    Medication Sig Start Date End Date Taking? Authorizing Provider   omeprazole (PRILOSEC) 10 MG delayed release capsule Take 10 mg by mouth in the morning. Yes Historical Provider, MD        Allergies:  Patient has no known allergies. Social History:  Social History     Socioeconomic History    Marital status: Single       Family History:    No family history on file.     Review of Systems:  CONSTITUTIONAL:  negative  HEENT:  negative  RESPIRATORY: negative except for SOB with pain  CARDIOVASCULAR:  negative  GASTROINTESTINAL:  negative except for nausea, vomiting, constipation, and abdominal pain  GENITOURINARY:  negative  HEMATOLOGIC/LYMPHATIC:  negative  NEUROLOGICAL:  negative  SKIN:  negative      OBJECTIVE    Vital Signs:  Patient Vitals for the past 24 hrs:   BP Temp Temp src Pulse Resp SpO2 Weight   22 0612 121/81 97 °F (36.1 °C) Temporal 92 18 100 % 163 lb 5.8 oz (74.1 kg)   22 0540 107/73 -- -- 78 16 100 % --   22 0437 112/80 -- -- 87 16 98 % --   22 0154 (!) 148/86 -- -- -- -- -- --   22 0151 -- 98.1 °F (36.7 °C) Oral 67 18 96 % --      TEMPERATURE HISTORY 24H: Temp (24hrs), Av.6 °F (36.4 °C), Min:97 °F (36.1 °C), Max:98.1 °F (36.7 °C)    BLOOD PRESSURE HISTORY: Systolic (37WND), YMF:893 , Min:107 , DTF:220    Diastolic (65JQM), GKF:10, Min:73, Max:86    Admission Weight: 163 lb 5.8 oz (74.1 kg)     No intake or output data in the 24 hours ending 22 0832  Drain/tube Output:         Physical Exam:  CONSTITUTIONAL:  alert, no apparent distress and normal weight  NEUROLOGIC:  Mental Status Exam:  Level of Alertness:   awake  Orientation:   person, place, time  EYES:  sclera clear  ENT:  normocepalic, without obvious abnormality  NECK:  supple, symmetrical, trachea midline  LUNGS:  clear to auscultation  CARDIOVASCULAR:  regular rate and rhythm and no murmur noted  ABDOMEN: soft, non-distended, tenderness noted in the right lower quadrant, voluntary guarding present, no masses palpated, normal bowel sounds,  no scars, and hernia absent  Extremities: no edema  SKIN:  no bruising or bleeding and normal skin color, texture, turgor    Labs:  CBC:    Recent Labs     07/28/22 0230   WBC 17.5*   HGB 14.7   HCT 44.0        BMP:    Recent Labs     07/28/22 0230      K 3.9      CO2 28   BUN 19   CREATININE 1.0   GLUCOSE 143*     Hepatic:    Recent Labs     07/28/22 0230   AST 23   ALT 30   BILITOT 0.3   ALKPHOS 105     Amylase:  No results found for: AMYLASE  Lipase:    Lab Results   Component Value Date/Time    LIPASE 21.0 07/28/2022 02:30 AM      Mag:  No results found for: MG  Phos:   No results found for: PHOS   Coags: No results found for: PROTIME, INR, APTT    Cultures:  Anaerobic culture  No results found for: LABANAE  Fungus stain  No results found for requested labs within last 30 days. Gram stain  No results found for requested labs within last 30 days. Organism  No results found for: [facility]  Surgical culture  No results found for: CXSURG  Blood culture 1  No results found for requested labs within last 30 days. Blood culture 2  No results found for requested labs within last 30 days. Fecal occult  No results found for requested labs within last 30 days. GI bacterial pathogens by PCR  No results found for requested labs within last 30 days. C. difficile  No results found for requested labs within last 30 days. Urine culture  No results found for: LABURIN    Imaging:  I have personally reviewed the following films:    CT ABDOMEN PELVIS W IV CONTRAST Additional Contrast? None    Result Date: 7/28/2022  EXAMINATION: CT OF THE ABDOMEN AND PELVIS WITH CONTRAST 7/28/2022 3:01 am TECHNIQUE: CT of the abdomen and pelvis was performed with the administration of intravenous contrast. Multiplanar reformatted images are provided for review.  Automated exposure control, iterative reconstruction, and/or weight based adjustment of the mA/kV was utilized to reduce the radiation dose to as low as reasonably achievable. COMPARISON: None. HISTORY: ORDERING SYSTEM PROVIDED HISTORY: LQL pain TECHNOLOGIST PROVIDED HISTORY: Reason for exam:->LQL pain Additional Contrast?->None Decision Support Exception - unselect if not a suspected or confirmed emergency medical condition->Emergency Medical Condition (MA) Reason for Exam: LLQ pain Relevant Medical/Surgical History: Abdominal Pain (Pt states that his abdominal pain started today around 2pm. Pt states that he has nausea and vomiting. Pt states no constipation or diarrhea. Pt states pain feels like a achy pain from belly button down ) FINDINGS: Lower Chest: No acute findings. Organs: The liver, gallbladder, pancreas, spleen, adrenals and kidneys reveal no acute findings. GI/Bowel: Enlarged appendix measuring 15 mm. Multiple stones are present, notably with a stone at the appendiceal orifice. Minimal surrounding inflammatory change. No gross perforation or evidence for abscess. Pelvis: No acute findings. Peritoneum/Retroperitoneum: No free air or free fluid. The aorta is normal in caliber. The visceral branches are patent. No lymphadenopathy. Bones/Soft Tissues: No abnormality identified. Enlarged appendix measuring 15 mm with appendicoliths, notably at the orifice. While there is minimal surrounding inflammatory change, this likely represents early acute appendicitis. Scheduled Meds:   sodium chloride flush  5-40 mL IntraVENous 2 times per day    enoxaparin  40 mg SubCUTAneous Daily    pantoprazole (PROTONIX) 40 mg injection  40 mg IntraVENous Daily    piperacillin-tazobactam  3,375 mg IntraVENous Q8H     Continuous Infusions:   sodium chloride      sodium chloride 125 mL/hr at 07/28/22 0616     PRN Meds:.sodium chloride flush, sodium chloride, ondansetron **OR** ondansetron, morphine **OR** morphine, oxyCODONE **OR** oxyCODONE, acetaminophen      ASSESSMENT:  40 y.o. male admitted with   1.  Acute appendicitis with generalized peritonitis, without gangrene or abscess, unspecified whether perforation present        Acute appendicitis      PLAN:  1. Laparoscopic appendectomy with Dr. Marquita Arredondo today  2. NPO  3. IV hydration; monitor and correct electrolytes  4. Antibiotics  5. Activity as tolerated  6. Pulmonary toilet, incentive spirometry  7. PRN analgesics and antiemetics--minimizing narcotics as tolerated  8. DVT prophylaxis with  Lovenox and SCD's  9. Disposition: Anticipate discharge home tomorrow    EDUCATION:  Educated patient on plan of care and disease process--all questions answered. The patient is not currently smoking. Recommend maintaining a smoke-free lifestyle. Plans discussed with patient and nursing. Reviewed and discussed with Dr. Marquita Arredondo. Signed:  LEE Castro - CNP  7/28/2022 8:32 AM    I have personally performed a face to face diagnostic evaluation on this patient. I have interviewed and examined the patient and I agree with the assessment above. Time was spent reviewing patient chart (including but not limited to notes, labs, imaging and other testing), interviewing and counseling patient and present family members, performing physical exam, documentation of my findings and subsequent follow up of ordered medication and testing, placing referrals and communication with patient care providers, coordinating future care as well as documentation in the EHR. This encompassed more than 50% of the total encounter time. In summary, my findings and plan are the following:   Mr. Temo Muller is a 40 y.o. male who presents with   Assessment:  Acute appendicitis with focal peritonitis    Plan:  1. The patient has a history, exam, and CT confirming appendicitis. Specific risks discussed include bleeding, infection, damage to surrounding structures, conversion to open, possible requirement of additional procedures, as well as the perioperative risks associated with general anesthesia.  Benefits include resolution of symptoms and prevention of future complications related to appendicitis. Alternatives include observation with medical management. Details of the procedure were discussed and all questions answered. The patient understands, agrees, and wishes to proceed. He will be taken to the OR this afternoon  2. NPO for procedure  3. IVF resuscitation  4. Antibiotics  5. Pain medication and antiemetics as needed  6. If non-perforated appendix encountered and patient's symptoms improve, could potentially be discharged tomorrow. Otherwise will need continued hospital admission for pain control and antibiotics    This plan was discussed at length with the patient. He was understanding and in agreement with the plan    Mitch Hatfield MD, FACS  7/28/2022  1:37 PM

## 2022-07-28 NOTE — PROGRESS NOTES
Pt arrived to unit in stable condition, VSS. NS gtt started, prn morphine given 7/10 for LLQ abdominal pain. Call light within reach, bed alarm active.

## 2022-07-28 NOTE — ANESTHESIA PRE PROCEDURE
Department of Anesthesiology  Preprocedure Note       Name:  Christian Peraza   Age:  40 y.o.  :  1985                                          MRN:  [id number]         Date:  2022      Surgeon: Jayden Menendez):  John Titus MD    Procedure: Procedure(s):  LAPAROSCOPIC  APPENDECTOMY    Medications prior to admission:   Prior to Admission medications    Medication Sig Start Date End Date Taking? Authorizing Provider   omeprazole (PRILOSEC) 10 MG delayed release capsule Take 10 mg by mouth in the morning.    Yes Historical Provider, MD       Current medications:    Current Facility-Administered Medications   Medication Dose Route Frequency Provider Last Rate Last Admin    sodium chloride flush 0.9 % injection 5-40 mL  5-40 mL IntraVENous 2 times per day John Titus MD        sodium chloride flush 0.9 % injection 5-40 mL  5-40 mL IntraVENous PRN John Titus MD        0.9 % sodium chloride infusion   IntraVENous PRN John Titus MD        ondansetron (ZOFRAN-ODT) disintegrating tablet 4 mg  4 mg Oral Q8H PRN John Titus MD        Or    [facility] 4 mg  4 mg IntraVENous Q6H PRN John Titus MD        enoxaparin (LOVENOX) injection 40 mg  40 mg SubCUTAneous Daily John Titus MD        pantoprazole (PROTONIX) 40 mg in sodium chloride (PF) 10 mL injection  40 mg IntraVENous Daily John Titus MD        piperacillin-tazobactam (ZOSYN) 3,375 mg in dextrose 5 % 50 mL IVPB extended infusion (mini-bag)  3,375 mg IntraVENous Q8H John Titus MD        0.9 % sodium chloride infusion   IntraVENous Continuous John Titus  mL/hr at 22 0616 New Bag at 22 0616    morphine (PF) injection 2 mg  2 mg IntraVENous Q2H PRN John Titus MD        Or    morphine injection 4 mg  4 mg IntraVENous Q2H PRN Jonh Titus MD   4 mg at 22 8158    oxyCODONE (ROXICODONE) immediate release tablet 5 mg  5 mg Oral Q4H PRN John Titus MD        Or   Kartik oxyCODONE (ROXICODONE) immediate release tablet 10 mg  10 mg Oral Q4H PRN Micheline Vallejo MD        acetaminophen (TYLENOL) tablet 650 mg  650 mg Oral Q4H PRN Micheline Vallejo MD           Allergies:  No Known Allergies    Problem List:    Patient Active Problem List   Diagnosis Code    Acute appendicitis K35.80       Past Medical History:  No past medical history on file. Past Surgical History:  No past surgical history on file.     Social History:    Social History     Tobacco Use    Smoking status: Not on file    Smokeless tobacco: Not on file   Substance Use Topics    Alcohol use: Not on file                                Counseling given: Not Answered      Vital Signs (Current):   Vitals:    07/28/22 0154 07/28/22 0437 07/28/22 0540 07/28/22 0612   BP: (!) 148/86 112/80 107/73 121/81   Pulse:  87 78 92   Resp:  16 16 18   Temp:    97 °F (36.1 °C)   TempSrc:    Temporal   SpO2:  98% 100% 100%   Weight:    163 lb 5.8 oz (74.1 kg)                                              BP Readings from Last 3 Encounters:   07/28/22 121/81       NPO Status:                                                                                 BMI:   Wt Readings from Last 3 Encounters:   07/28/22 163 lb 5.8 oz (74.1 kg)     There is no height or weight on file to calculate BMI.    CBC:   Lab Results   Component Value Date/Time    WBC 17.5 07/28/2022 02:30 AM    RBC 4.80 07/28/2022 02:30 AM    HGB 14.7 07/28/2022 02:30 AM    HCT 44.0 07/28/2022 02:30 AM    MCV 91.6 07/28/2022 02:30 AM    RDW 13.5 07/28/2022 02:30 AM     07/28/2022 02:30 AM       CMP:   Lab Results   Component Value Date/Time     07/28/2022 02:30 AM    K 3.9 07/28/2022 02:30 AM     07/28/2022 02:30 AM    CO2 28 07/28/2022 02:30 AM    BUN 19 07/28/2022 02:30 AM    CREATININE 1.0 07/28/2022 02:30 AM    GFRAA >60 07/28/2022 02:30 AM    AGRATIO 1.6 07/28/2022 02:30 AM    LABGLOM >60 07/28/2022 02:30 AM    GLUCOSE 143 07/28/2022 02:30 AM    PROT 7.9 07/28/2022 02:30 AM    CALCIUM 9.7 07/28/2022 02:30 AM    BILITOT 0.3 07/28/2022 02:30 AM    ALKPHOS 105 07/28/2022 02:30 AM    AST 23 07/28/2022 02:30 AM    ALT 30 07/28/2022 02:30 AM       POC Tests: No results for input(s): POCGLU, POCNA, POCK, POCCL, POCBUN, POCHEMO, POCHCT in the last 72 hours. Coags: No results found for: PROTIME, INR, APTT    HCG (If Applicable): No results found for: PREGTESTUR, PREGSERUM, HCG, HCGQUANT     ABGs: No results found for: PHART, PO2ART, KCH8WRO, QWA9SKX, BEART, W5RRAYOH     Type & Screen (If Applicable):  No results found for: LABABO, LABRH    Drug/Infectious Status (If Applicable):  No results found for: HIV, HEPCAB    COVID-19 Screening (If Applicable): No results found for: COVID19        Anesthesia Evaluation  Patient summary reviewed and Nursing notes reviewed  Airway: Mallampati: II  TM distance: >3 FB   Neck ROM: full  Mouth opening: > = 3 FB   Dental:          Pulmonary:                              Cardiovascular:  Exercise tolerance: good (>4 METS),                     Neuro/Psych:               GI/Hepatic/Renal:             Endo/Other:                     Abdominal:             Vascular: Other Findings: Loose upper molar          Anesthesia Plan      general     ASA 1     (Loose upper molar)  Induction: intravenous. MIPS: Postoperative opioids intended, Prophylactic antiemetics administered and Postoperative trial extubation. Anesthetic plan and risks discussed with patient. Plan discussed with CRNA.           Post-op pain plan if not by surgeon: single peripheral nerve block            Jose Angel Pereira MD   7/28/2022

## 2022-07-28 NOTE — PROGRESS NOTES
Pt arrived from OR to PACU, oral airway in place, arouses to stimuli. VSS, O2 sats 97% on 6 L simple mask. Incisions to abdomen dry and intact, abdomen soft, ice pack in place. Will monitor.

## 2022-07-29 VITALS
OXYGEN SATURATION: 96 % | DIASTOLIC BLOOD PRESSURE: 80 MMHG | WEIGHT: 164.91 LBS | HEART RATE: 62 BPM | RESPIRATION RATE: 16 BRPM | TEMPERATURE: 97.8 F | SYSTOLIC BLOOD PRESSURE: 95 MMHG

## 2022-07-29 LAB
ANION GAP SERPL CALCULATED.3IONS-SCNC: 9 MMOL/L (ref 3–16)
BASOPHILS ABSOLUTE: 0 K/UL (ref 0–0.2)
BASOPHILS RELATIVE PERCENT: 0.2 %
BUN BLDV-MCNC: 9 MG/DL (ref 7–20)
CALCIUM SERPL-MCNC: 8.6 MG/DL (ref 8.3–10.6)
CHLORIDE BLD-SCNC: 108 MMOL/L (ref 99–110)
CO2: 22 MMOL/L (ref 21–32)
CREAT SERPL-MCNC: 0.8 MG/DL (ref 0.9–1.3)
EOSINOPHILS ABSOLUTE: 0 K/UL (ref 0–0.6)
EOSINOPHILS RELATIVE PERCENT: 0.1 %
GFR AFRICAN AMERICAN: >60
GFR NON-AFRICAN AMERICAN: >60
GLUCOSE BLD-MCNC: 111 MG/DL (ref 70–99)
HCT VFR BLD CALC: 42.5 % (ref 40.5–52.5)
HEMOGLOBIN: 14.2 G/DL (ref 13.5–17.5)
LYMPHOCYTES ABSOLUTE: 1.8 K/UL (ref 1–5.1)
LYMPHOCYTES RELATIVE PERCENT: 16.5 %
MCH RBC QN AUTO: 30.9 PG (ref 26–34)
MCHC RBC AUTO-ENTMCNC: 33.4 G/DL (ref 31–36)
MCV RBC AUTO: 92.5 FL (ref 80–100)
MONOCYTES ABSOLUTE: 0.9 K/UL (ref 0–1.3)
MONOCYTES RELATIVE PERCENT: 8.1 %
NEUTROPHILS ABSOLUTE: 8.3 K/UL (ref 1.7–7.7)
NEUTROPHILS RELATIVE PERCENT: 75.1 %
PDW BLD-RTO: 13.7 % (ref 12.4–15.4)
PLATELET # BLD: 241 K/UL (ref 135–450)
PMV BLD AUTO: 7.4 FL (ref 5–10.5)
POTASSIUM SERPL-SCNC: 3.9 MMOL/L (ref 3.5–5.1)
RBC # BLD: 4.59 M/UL (ref 4.2–5.9)
SODIUM BLD-SCNC: 139 MMOL/L (ref 136–145)
WBC # BLD: 11 K/UL (ref 4–11)

## 2022-07-29 PROCEDURE — 85025 COMPLETE CBC W/AUTO DIFF WBC: CPT

## 2022-07-29 PROCEDURE — 2580000003 HC RX 258: Performed by: SURGERY

## 2022-07-29 PROCEDURE — 96372 THER/PROPH/DIAG INJ SC/IM: CPT

## 2022-07-29 PROCEDURE — 36415 COLL VENOUS BLD VENIPUNCTURE: CPT

## 2022-07-29 PROCEDURE — A4216 STERILE WATER/SALINE, 10 ML: HCPCS | Performed by: SURGERY

## 2022-07-29 PROCEDURE — APPNB30 APP NON BILLABLE TIME 0-30 MINS: Performed by: NURSE PRACTITIONER

## 2022-07-29 PROCEDURE — G0378 HOSPITAL OBSERVATION PER HR: HCPCS

## 2022-07-29 PROCEDURE — 96376 TX/PRO/DX INJ SAME DRUG ADON: CPT

## 2022-07-29 PROCEDURE — 80048 BASIC METABOLIC PNL TOTAL CA: CPT

## 2022-07-29 PROCEDURE — 6360000002 HC RX W HCPCS: Performed by: SURGERY

## 2022-07-29 PROCEDURE — C9113 INJ PANTOPRAZOLE SODIUM, VIA: HCPCS | Performed by: SURGERY

## 2022-07-29 PROCEDURE — 6370000000 HC RX 637 (ALT 250 FOR IP): Performed by: SURGERY

## 2022-07-29 PROCEDURE — 99024 POSTOP FOLLOW-UP VISIT: CPT | Performed by: SURGERY

## 2022-07-29 RX ORDER — OXYCODONE HYDROCHLORIDE 5 MG/1
5 TABLET ORAL EVERY 6 HOURS PRN
Qty: 12 TABLET | Refills: 0 | Status: SHIPPED | OUTPATIENT
Start: 2022-07-29 | End: 2022-08-05

## 2022-07-29 RX ADMIN — PIPERACILLIN AND TAZOBACTAM 3375 MG: 3; .375 INJECTION, POWDER, FOR SOLUTION INTRAVENOUS at 04:38

## 2022-07-29 RX ADMIN — SODIUM CHLORIDE 40 MG: 9 INJECTION, SOLUTION INTRAMUSCULAR; INTRAVENOUS; SUBCUTANEOUS at 09:04

## 2022-07-29 RX ADMIN — ENOXAPARIN SODIUM 40 MG: 100 INJECTION SUBCUTANEOUS at 09:04

## 2022-07-29 RX ADMIN — Medication 10 ML: at 09:06

## 2022-07-29 RX ADMIN — KETOROLAC TROMETHAMINE 30 MG: 30 INJECTION, SOLUTION INTRAMUSCULAR at 10:15

## 2022-07-29 RX ADMIN — ACETAMINOPHEN 325MG 650 MG: 325 TABLET ORAL at 10:15

## 2022-07-29 RX ADMIN — ACETAMINOPHEN 325MG 650 MG: 325 TABLET ORAL at 04:35

## 2022-07-29 RX ADMIN — SODIUM CHLORIDE: 9 INJECTION, SOLUTION INTRAVENOUS at 10:24

## 2022-07-29 RX ADMIN — KETOROLAC TROMETHAMINE 30 MG: 30 INJECTION, SOLUTION INTRAMUSCULAR at 04:35

## 2022-07-29 RX ADMIN — Medication 10 ML: at 09:05

## 2022-07-29 ASSESSMENT — PAIN SCALES - GENERAL
PAINLEVEL_OUTOF10: 2
PAINLEVEL_OUTOF10: 2
PAINLEVEL_OUTOF10: 1

## 2022-07-29 ASSESSMENT — PAIN DESCRIPTION - ORIENTATION: ORIENTATION: LOWER;MID

## 2022-07-29 ASSESSMENT — PAIN DESCRIPTION - LOCATION
LOCATION: ABDOMEN
LOCATION: ABDOMEN

## 2022-07-29 NOTE — PROGRESS NOTES
Assessment complete, VSS. Pt A&O x4. Respirations regular and unlabored on room air. 3 lap sites are present to abdomen, closed with surgical glue and no drainage present. Sites are tender, ice pack applied. Call light within reach, bed alarm active, no further needs at this time.

## 2022-07-29 NOTE — DISCHARGE INSTRUCTIONS
[facility]        Discharge Instructions  Activity  No heavy lifting over 20 lbs for 6 weeks from date of surgery  It is OK to be up walking around; walking up and down stairs is also OK. Do what is comfortable: stop and rest if you feel tired  Driving  Do not drive for at least 48 hours after your surgery. You have undergone a major operation and your reaction time and coordination will be slowed until your body totally reverses the effect of the anesthetic and Do not drive while taking narcotic pain medication  It is OK to be up walking around; walking up and down stairs is also OK. Do what is comfortable: stop and rest if you feel tired  Diet  Drink plenty of fluids  Pain  Tylenol 1000mg by mouth every 4 hours for 1-2 weeks, Ibuprofen 600mg by mouth with food for 1-2 weeks (may need to adjust the dose as directed on the bottle if enteric coated ibuprofen chosen), and may use narcotic pain medication as prescribed for breakthrough pain  Narcotic pain medication will not be refilled on weekends and after hours. Please contact the office Monday-Friday 8-4p if a refill is requested  During laparoscopic surgery, gas is pumped into the abdominal cavity and may cause abdominal, shoulder, rib pain for the first few days and May use ice on incision for short periods of time as needed  Nausea  Avoid taking narcotic pain medication on an empty stomach which may result in nausea and if pain medication is causing nausea try decreasing the dose  Nausea can be common for 24 hours after surgery. If nausea uncontrolled or worsening, contact the office or go to the ED  Constipation  Pain medication can be constipating.  Often helps to take a stool softener with the goal of at least one soft bowel movement daily with minimal straining, may need to increase water and fiber intake, may supplement with benefiber, and increase in activity will also be helpful  If stool softener is needed, recommend the following over the counter medications as needed, Colace 100mg by mouth twice daily, and Miralax 17 gm by mouth 1-3 times daily with glass of water  Wound Care  You have clear surgical glue closing your incisions and this will peel away in 1-2 weeks. You may shower tomorrow and avoid hot tubs, pools, open water until seen in office  If incisional bleeding noted, hold hard pressure for 15-20 minutes. If not controlled, either contact the office or present to nearest ED  It is common to have bruising or mild redness around the wounds within the first few days after surgery. If it worsens, or starts draining, do not hesitate to contact the office  Cautions  Watch for signs of infection: Fever over 100.5, excessive warmth or redness around incisions, bloody or cloudy drainage from incisions  Watch for signs of complication: uncontrolled pain, nausea, bloating, sudden lightheadness  Serious problems may arise after surgery that need urgent or immediate care.  Do not hesitate to contact the office with any questions  Followup  Call the office to schedule your post-operative appointment with your surgeon for 2 weeks

## 2022-07-29 NOTE — DISCHARGE SUMMARY
[facility]        Discharge Summary    Patient Identification  Patient Name: Christian Peraza  MRN: [id number]  [place]: 1985 40 y.o. male  Admission date: 7/28/2022  1:46 AM   Discharge date:   7/29/2022                                   Discharge Disposition: home    Discharge Condition: good    Discharge Diagnoses:   Patient Active Problem List   Diagnosis    Acute appendicitis       Consults: None     Operations/Procedures: OR Date 7/28/22, laparoscopic appendectomy for acute non-perforated appendicitis    Patient Instructions: Activity: No heavy lifting over 20 lbs for 6 weeks from date of surgery  Diet: regular diet as tolerated  Wound Care: Surgical glue over incisions. May shower normally and avoid scrubbing over incisions and avoid hot tubs, pools, open water until seen in office  Follow-up with Dr. Bekah Redd in  2 weeks. Discharge Medications:        Medication List        START taking these medications      oxyCODONE 5 MG immediate release tablet  Commonly known as: Roxicodone  Take 1 tablet by mouth every 6 hours as needed for Pain for up to 7 days. Take lowest dose possible to manage pain; may stop taking sooner than 7 days if pain is able to be controlled with non-narcotic analgesics and therapies. ASK your doctor about these medications      omeprazole 10 MG delayed release capsule  Commonly known as: PRILOSEC               Where to Get Your Medications        You can get these medications from any pharmacy    Bring a paper prescription for each of these medications  oxyCODONE 5 MG immediate release tablet          HPI and Hospital Course:     Pt admitted and underwent OR Date 7/28/22, laparoscopic appendectomy for acute non-perforated appendicitis. He tolerated this surgery and the post op period well.   He is discharged to home with normal a temperature and vitals, tolerating an oral diet well, and having normal bowel

## 2022-07-29 NOTE — OP NOTE
[facility]                     [facility], [address] [place] [address]                                OPERATIVE REPORT    PATIENT NAME: Garth Ca                    :        1985  MED REC NO:   [id number]                          ROOM:       [id number]  ACCOUNT NO:   [de-identified]                           ADMIT DATE: 2022  PROVIDER:     Amna Whitlock MD    DATE OF PROCEDURE:  2022    PREOPERATIVE DIAGNOSIS:  Acute appendicitis. POSTOPERATIVE DIAGNOSIS:  Acute nonperforated appendicitis. PROCEDURE:  Laparoscopic appendectomy. SURGEON:  Amna Whitlock MD    ANESTHESIA:  General.    INDICATIONS:  This is a 72-year-old male who presents to [facility]  with acute appendicitis. The risks, benefits and alternative treatments  of laparoscopic appendectomy were discussed. Details of the procedure  were discussed. All questions were answered. The patient understood,  agreed, and wished to proceed. OPERATIVE PROCEDURE:  The patient was brought to the operative suite,  laid in supine position. General anesthesia was induced and well  tolerated. The patient's abdomen was prepped and draped in the usual  sterile fashion. After a proper time-out was performed verifying  operative site, procedure, and patient, a supraumbilical incision was  made with a scalpel. Using an open Nazario technique, fascia was  elevated with two interrupted 0 Vicryl sutures. The fascia was then  divided with electrocautery and the peritoneum entered with blunt  dissection. The Nazario was then placed into the abdominal cavity under  direction vision. The peritoneal cavity was then insufflated with  carbon dioxide to a pressure of 15 mmHg, which was well tolerated. Additional trocars were then placed in the following locations, a 5 mm  in the left lower quadrant and a 5 mm suprapubic.   The right lower  quadrant was inspected where acutely inflamed nonperforated appendix was  easily identified. The base of the appendix was relatively uninvolved  with the inflammatory process. The mesoappendix was divided with  laparoscopic LigaSure device and the base of the appendix was divided  flushed with the cecum with laparoscopic white load stapler. The  appendix was freed from its surrounding tissues and placed into an  Endobag and removed through one of the trocars and placed on the table  as specimen. The right lower quadrant was inspected again. Hemostasis  was verified. The staple line was intact. There was no spillage and as  such no irrigation or suction was necessary. The remainder of the  abdomen was inspected including all trocars. No other injuries or  abnormalities were noted. A Webtalk suture passer was used to pass the  PDS suture through the umbilical fascial defect. The abdomen was then  allowed to desufflate and all trocars were then removed under direct  vision. Local anesthetic was injected in the wounds and all skin  incisions were closed with 4-0 suture. The wounds were then cleaned and  dressed with Dermabond. The patient tolerated the procedure well and  was transferred to the PACU in stable condition. SPECIMENS:  Appendix. DRAINS:  None. COMPLICATIONS:  None. BLOOD LOSS:  Less than 50 mL.         Enma Méndez MD    D: 07/28/2022 18:12:27       T: 07/29/2022 4:46:25     [email or web address]  Job#: [id number]     Doc#: [id number]    CC:

## 2022-07-29 NOTE — PROGRESS NOTES
Pt resting well with minimal abdominal pain. Scheduled pain medications given. Pt tolerating water sips with meds. Call light within reach, bed alarm active.

## 2022-07-29 NOTE — PROGRESS NOTES
Pt appears more alert now since the start of shift. Pt encouraged via  line to use call light for assistance in getting up to the bathroom. All needs addressed and questions answered.

## 2022-09-25 ENCOUNTER — APPOINTMENT (OUTPATIENT)
Dept: CT IMAGING | Age: 37
End: 2022-09-25

## 2022-09-25 ENCOUNTER — HOSPITAL ENCOUNTER (EMERGENCY)
Age: 37
Discharge: HOME | End: 2022-09-25

## 2022-09-25 VITALS
HEIGHT: 67 IN | OXYGEN SATURATION: 98 % | HEART RATE: 76 BPM | SYSTOLIC BLOOD PRESSURE: 119 MMHG | WEIGHT: 151 LBS | TEMPERATURE: 97.8 F | DIASTOLIC BLOOD PRESSURE: 84 MMHG | BODY MASS INDEX: 23.7 KG/M2 | RESPIRATION RATE: 18 BRPM

## 2022-09-25 DIAGNOSIS — R14.0 BLOATING: Primary | ICD-10-CM

## 2022-09-25 DIAGNOSIS — K64.4 EXTERNAL HEMORRHOID: ICD-10-CM

## 2022-09-25 DIAGNOSIS — H92.03 OTALGIA OF BOTH EARS: ICD-10-CM

## 2022-09-25 LAB
A/G RATIO: 1.3 (ref 1.1–2.2)
ALBUMIN SERPL-MCNC: 4.3 G/DL (ref 3.4–5)
ALP BLD-CCNC: 108 U/L (ref 40–129)
ALT SERPL-CCNC: 13 U/L (ref 10–40)
ANION GAP SERPL CALCULATED.3IONS-SCNC: 9 MMOL/L (ref 3–16)
AST SERPL-CCNC: 14 U/L (ref 15–37)
BASOPHILS ABSOLUTE: 0.1 K/UL (ref 0–0.2)
BASOPHILS RELATIVE PERCENT: 0.8 %
BILIRUB SERPL-MCNC: 0.3 MG/DL (ref 0–1)
BILIRUBIN URINE: NEGATIVE
BLOOD, URINE: NEGATIVE
BUN BLDV-MCNC: 13 MG/DL (ref 7–20)
CALCIUM SERPL-MCNC: 9.4 MG/DL (ref 8.3–10.6)
CHLORIDE BLD-SCNC: 106 MMOL/L (ref 99–110)
CLARITY: CLEAR
CO2: 26 MMOL/L (ref 21–32)
COLOR: YELLOW
CREAT SERPL-MCNC: 1 MG/DL (ref 0.9–1.3)
EOSINOPHILS ABSOLUTE: 0.1 K/UL (ref 0–0.6)
EOSINOPHILS RELATIVE PERCENT: 1.8 %
GFR AFRICAN AMERICAN: >60
GFR NON-AFRICAN AMERICAN: >60
GLUCOSE BLD-MCNC: 104 MG/DL (ref 70–99)
GLUCOSE URINE: NEGATIVE MG/DL
HCT VFR BLD CALC: 43.2 % (ref 40.5–52.5)
HEMOGLOBIN: 14.7 G/DL (ref 13.5–17.5)
KETONES, URINE: NEGATIVE MG/DL
LEUKOCYTE ESTERASE, URINE: NEGATIVE
LIPASE: 32 U/L (ref 13–60)
LYMPHOCYTES ABSOLUTE: 2.4 K/UL (ref 1–5.1)
LYMPHOCYTES RELATIVE PERCENT: 29.8 %
MCH RBC QN AUTO: 31.1 PG (ref 26–34)
MCHC RBC AUTO-ENTMCNC: 34 G/DL (ref 31–36)
MCV RBC AUTO: 91.7 FL (ref 80–100)
MICROSCOPIC EXAMINATION: NORMAL
MONOCYTES ABSOLUTE: 0.5 K/UL (ref 0–1.3)
MONOCYTES RELATIVE PERCENT: 6.6 %
NEUTROPHILS ABSOLUTE: 4.9 K/UL (ref 1.7–7.7)
NEUTROPHILS RELATIVE PERCENT: 61 %
NITRITE, URINE: NEGATIVE
PDW BLD-RTO: 13.6 % (ref 12.4–15.4)
PH UA: 5 (ref 5–8)
PLATELET # BLD: 250 K/UL (ref 135–450)
PMV BLD AUTO: 7.6 FL (ref 5–10.5)
POTASSIUM REFLEX MAGNESIUM: 4.2 MMOL/L (ref 3.5–5.1)
PROTEIN UA: NEGATIVE MG/DL
RBC # BLD: 4.71 M/UL (ref 4.2–5.9)
SODIUM BLD-SCNC: 141 MMOL/L (ref 136–145)
SPECIFIC GRAVITY UA: 1.01 (ref 1–1.03)
TOTAL PROTEIN: 7.7 G/DL (ref 6.4–8.2)
URINE REFLEX TO CULTURE: NORMAL
URINE TYPE: NORMAL
UROBILINOGEN, URINE: 0.2 E.U./DL
WBC # BLD: 8 K/UL (ref 4–11)

## 2022-09-25 PROCEDURE — 6360000004 HC RX CONTRAST MEDICATION: Performed by: PHYSICIAN ASSISTANT

## 2022-09-25 PROCEDURE — 6370000000 HC RX 637 (ALT 250 FOR IP): Performed by: PHYSICIAN ASSISTANT

## 2022-09-25 PROCEDURE — 80053 COMPREHEN METABOLIC PANEL: CPT

## 2022-09-25 PROCEDURE — 85025 COMPLETE CBC W/AUTO DIFF WBC: CPT

## 2022-09-25 PROCEDURE — 99285 EMERGENCY DEPT VISIT HI MDM: CPT

## 2022-09-25 PROCEDURE — 74177 CT ABD & PELVIS W/CONTRAST: CPT

## 2022-09-25 PROCEDURE — 81003 URINALYSIS AUTO W/O SCOPE: CPT

## 2022-09-25 PROCEDURE — 83690 ASSAY OF LIPASE: CPT

## 2022-09-25 RX ORDER — CETIRIZINE HYDROCHLORIDE 10 MG/1
10 TABLET, CHEWABLE ORAL DAILY
Qty: 30 TABLET | Refills: 0 | Status: SHIPPED | OUTPATIENT
Start: 2022-09-25 | End: 2022-10-25

## 2022-09-25 RX ORDER — DICYCLOMINE HYDROCHLORIDE 10 MG/1
10 CAPSULE ORAL ONCE
Status: COMPLETED | OUTPATIENT
Start: 2022-09-25 | End: 2022-09-25

## 2022-09-25 RX ORDER — ONDANSETRON 4 MG/1
4 TABLET, ORALLY DISINTEGRATING ORAL EVERY 8 HOURS PRN
Qty: 20 TABLET | Refills: 0 | Status: SHIPPED | OUTPATIENT
Start: 2022-09-25

## 2022-09-25 RX ORDER — PANTOPRAZOLE SODIUM 40 MG/1
40 TABLET, DELAYED RELEASE ORAL ONCE
Status: COMPLETED | OUTPATIENT
Start: 2022-09-25 | End: 2022-09-25

## 2022-09-25 RX ORDER — DICYCLOMINE HYDROCHLORIDE 10 MG/1
10 CAPSULE ORAL EVERY 6 HOURS PRN
Qty: 20 CAPSULE | Refills: 0 | Status: SHIPPED | OUTPATIENT
Start: 2022-09-25

## 2022-09-25 RX ORDER — ONDANSETRON 4 MG/1
4 TABLET, ORALLY DISINTEGRATING ORAL ONCE
Status: COMPLETED | OUTPATIENT
Start: 2022-09-25 | End: 2022-09-25

## 2022-09-25 RX ADMIN — IOPAMIDOL 75 ML: 755 INJECTION, SOLUTION INTRAVENOUS at 08:58

## 2022-09-25 RX ADMIN — ONDANSETRON 4 MG: 4 TABLET, ORALLY DISINTEGRATING ORAL at 08:36

## 2022-09-25 RX ADMIN — DICYCLOMINE HYDROCHLORIDE 10 MG: 10 CAPSULE ORAL at 08:35

## 2022-09-25 RX ADMIN — PANTOPRAZOLE SODIUM 40 MG: 40 TABLET, DELAYED RELEASE ORAL at 08:35

## 2022-09-25 ASSESSMENT — ENCOUNTER SYMPTOMS
ANAL BLEEDING: 0
VOMITING: 0
BACK PAIN: 0
CHEST TIGHTNESS: 0
CONSTIPATION: 1
DIARRHEA: 0
BLOOD IN STOOL: 0
RECTAL PAIN: 1
SHORTNESS OF BREATH: 0
ABDOMINAL PAIN: 1
COLOR CHANGE: 0
ABDOMINAL DISTENTION: 1
COUGH: 0
NAUSEA: 0
RESPIRATORY NEGATIVE: 1

## 2022-09-25 ASSESSMENT — PAIN - FUNCTIONAL ASSESSMENT: PAIN_FUNCTIONAL_ASSESSMENT: 0-10

## 2022-09-25 ASSESSMENT — PAIN DESCRIPTION - LOCATION: LOCATION: ABDOMEN

## 2022-09-25 ASSESSMENT — PAIN SCALES - GENERAL: PAINLEVEL_OUTOF10: 10

## 2022-09-25 NOTE — ED PROVIDER NOTES
[facility]        Pt Name: Vickey Reese  MRN: [id number]  [place] 1985  Date of evaluation: 9/25/2022  Provider: BERNIE Rice  PCP: No primary care provider on file. Note Started: 8:20 AM EDT       JANNETTE. I have evaluated this patient. My supervising physician was available for consultation. CHIEF COMPLAINT       Chief Complaint   Patient presents with    Constipation     Arrived per family d/t abd comp; states being constipated; hx bowel issues since surgery append       HISTORY OF PRESENT ILLNESS   (Location, Timing/Onset, Context/Setting, Quality, Duration, Modifying Factors, Severity, Associated Signs and Symptoms)  Note limiting factors. Chief Complaint: Constipation    Vickey Reese is a 40 y.o. male with no significant past medical history who presents to the ED with complaint of gas, bloating and constipation. Patient states for the past 2 to 3 months ever since he had appendectomy he has had feelings of constipation, bloating and gas. Patient states he is not straining with bowel movements. He states he does have daily bowel movements but states he feels that that it is \"harder\". Patient states he now has a bump to his rectum and pain to his rectum when he has a bowel movement. He denies nausea or vomiting. He states he feels like when he eats greasy foods he becomes more bloated and gassy. He denies any other changes in appetite or oral intake. He denies any fever or chills. No chest pain or shortness of breath. Denies any urinary symptoms or decreased urinary output. Became concerned and came to the ED for further evaluation and treatment. He states he does have a problem with gastritis and is currently on omeprazole at home. He denies a history of gallbladder problems. He states bloating/aching pain rated 10/10 diffusely throughout his abdomen.   He denies any radiation to his symptoms. Is status post laparoscopic appendectomy several months ago. Has not had any other surgeries to his abdomen the past.    Patient speaks primarily [place] and  was utilized for history and physical examination. Nursing Notes were all reviewed and agreed with or any disagreements were addressed in the HPI. REVIEW OF SYSTEMS    (2-9 systems for level 4, 10 or more for level 5)     Review of Systems   Constitutional:  Positive for appetite change. Negative for activity change, chills, diaphoresis, fatigue and fever. Respiratory: Negative. Negative for cough, chest tightness and shortness of breath. Cardiovascular: Negative. Negative for chest pain, palpitations and leg swelling. Gastrointestinal:  Positive for abdominal distention, abdominal pain, constipation and rectal pain. Negative for anal bleeding, blood in stool, diarrhea, nausea and vomiting. Genitourinary:  Negative for decreased urine volume, difficulty urinating, dysuria, flank pain, frequency, hematuria and urgency. Musculoskeletal:  Negative for arthralgias, back pain, myalgias, neck pain and neck stiffness. Skin:  Negative for color change, pallor, rash and wound. Neurological:  Negative for dizziness, light-headedness and headaches. Positives and Pertinent negatives as per HPI. Except as noted above in the ROS, all other systems were reviewed and negative. PAST MEDICAL HISTORY   History reviewed. No pertinent past medical history. SURGICAL HISTORY     Past Surgical History:   Procedure Laterality Date    LAPAROSCOPIC APPENDECTOMY N/A 7/28/2022    LAPAROSCOPIC  APPENDECTOMY performed by Josephine Oliver MD at [address] [facility]       Previous Medications    OMEPRAZOLE (PRILOSEC) 10 MG DELAYED RELEASE CAPSULE    Take 10 mg by mouth in the morning. ALLERGIES     Patient has no known allergies. FAMILYHISTORY     History reviewed. No pertinent family history.        SOCIAL Rectum: Tenderness and external hemorrhoid present. No mass, anal fissure or internal hemorrhoid. Normal anal tone. Musculoskeletal:         General: Normal range of motion. Cervical back: Normal range of motion and neck supple. Skin:     General: Skin is warm and dry. Coloration: Skin is not pale. Findings: No erythema or rash. Neurological:      Mental Status: He is alert and oriented to person, place, and time. Psychiatric:         Behavior: Behavior normal.       DIAGNOSTIC RESULTS   LABS:    Labs Reviewed   COMPREHENSIVE METABOLIC PANEL W/ REFLEX TO MG FOR LOW K - Abnormal; Notable for the following components:       Result Value    Glucose 104 (*)     AST 14 (*)     All other components within normal limits   CBC WITH AUTO DIFFERENTIAL   LIPASE   URINALYSIS WITH REFLEX TO CULTURE       When ordered only abnormal lab results are displayed. All other labs were within normal range or not returned as of this dictation. EKG: When ordered, EKG's are interpreted by the Emergency Department Physician in the absence of a cardiologist.  Please see their note for interpretation of EKG. RADIOLOGY:   Non-plain film images such as CT, Ultrasound and MRI are read by the radiologist. Plain radiographic images are visualized and preliminarily interpreted by the ED Provider with the below findings:        Interpretation per the Radiologist below, if available at the time of this note:    CT ABDOMEN PELVIS W IV CONTRAST Additional Contrast? None   Final Result   No acute abdominopelvic abnormality. No results found.         PROCEDURES   Unless otherwise noted below, none     Procedures    CRITICAL CARE TIME       CONSULTS:  None      EMERGENCY DEPARTMENT COURSE and DIFFERENTIAL DIAGNOSIS/MDM:   Vitals:    Vitals:    09/25/22 0801   BP: 119/84   Pulse: 76   Resp: 18   Temp: 97.8 °F (36.6 °C)   TempSrc: Oral   SpO2: 98%   Weight: 151 lb (68.5 kg)   Height: 5' 6.54\" (1.69 m)       Patient was given the following medications:  Medications   dicyclomine (BENTYL) capsule 10 mg (10 mg Oral Given 9/25/22 0835)   ondansetron (ZOFRAN-ODT) disintegrating tablet 4 mg (4 mg Oral Given 9/25/22 0836)   pantoprazole (PROTONIX) tablet 40 mg (40 mg Oral Given 9/25/22 0835)   iopamidol (ISOVUE-370) 76 % injection 75 mL (75 mLs IntraVENous Given 9/25/22 0858)         Is this patient to be included in the SEP-1 Core Measure due to severe sepsis or septic shock? No   Exclusion criteria - the patient is NOT to be included for SEP-1 Core Measure due to: Infection is not suspected    Patient is a 70-year-old male who presents to the ED with complaint of bloating and constipation. He states he has been having symptoms ever since he had appendectomy. Abdomen appears slightly distended but there is mild tenderness palpation throughout the abdomen. No focal tenderness or rebound/guarding. IV was established and blood work obtained. He is afebrile stable vital signs. Given Bentyl, Zofran and Protonix here in the ED. Urinalysis showed no signs of infection. CBC showed normal white count, hemoglobin platelets. CMP relatively unremarkable. Lipase was normal.  CT of the abdomen pelvis showed no acute abnormality. He states he does have a couple small bumps to his rectum. He appears to have external hemorrhoids most likely secondary to straining due to constipation/gas. We will discharge home with Bentyl and Zofran. Will give hydrocortisone cream.  Instructed on stool softeners. Prior to discharge patient states he does like his ears checked. States he has pressure to his bilateral ears with associated congestion and rhinorrhea. His canals and TMs appear unremarkable other than maybe a slight middle ear effusion bilaterally. Concern for potential station tube dysfunction/sinusitis. We will give Zyrtec for home. Instructed on over-the-counter nasal steroids. Do not believe antibiotics indicated.   Low sufficient

## (undated) DEVICE — SUTURE PDS + SZ 0 L36IN ABSRB VLT CT 1 L36MM 1 2 CIR PDP346H

## (undated) DEVICE — COVER LT HNDL BLU PLAS

## (undated) DEVICE — LAPAROSCOPY PACK: Brand: MEDLINE INDUSTRIES, INC.

## (undated) DEVICE — TROCAR: Brand: KII FIOS FIRST ENTRY

## (undated) DEVICE — TROCARS: Brand: KII® BALLOON BLUNT TIP SYSTEM

## (undated) DEVICE — RELOAD STPL H1-2.5X45MM VASC THN TISS WHT 6 ROW B FRM SGL

## (undated) DEVICE — SOLUTION IRRIG 1000ML 0.9% SOD CHL USP POUR PLAS BTL

## (undated) DEVICE — STERILE POLYISOPRENE POWDER-FREE SURGICAL GLOVES: Brand: PROTEXIS

## (undated) DEVICE — DRAPE,LAP,CHOLE,W/TROUGHS,STERILE: Brand: MEDLINE

## (undated) DEVICE — CUTTER ENDOSCP L340MM LIN ARTC SGL STROKE FIRING ENDOPATH

## (undated) DEVICE — NEEDLE HYPO 22GA L1.5IN BLK POLYPR HUB S STL REG BVL STR

## (undated) DEVICE — APPLICATOR MEDICATED 26 CC SOLUTION HI LT ORNG CHLORAPREP

## (undated) DEVICE — SHEET,DRAPE,53X77,STERILE: Brand: MEDLINE

## (undated) DEVICE — SUTURE MCRYL + SZ 4-0 L27IN ABSRB UD L19MM PS-2 3/8 CIR MCP426H

## (undated) DEVICE — MERCY FAIRFIELD TURNOVER KIT: Brand: MEDLINE INDUSTRIES, INC.

## (undated) DEVICE — TROCAR: Brand: KII® SLEEVE

## (undated) DEVICE — ELECTRODE PT RET AD L9FT HI MOIST COND ADH HYDRGEL CORDED

## (undated) DEVICE — ADHESIVE SKIN CLSR 0.7ML TOP DERMBND ADV

## (undated) DEVICE — TISSUE RETRIEVAL SYSTEM: Brand: INZII RETRIEVAL SYSTEM

## (undated) DEVICE — TOTAL TRAY, DB, 100% SILI FOLEY, 16FR 10: Brand: MEDLINE

## (undated) DEVICE — PMI DISPOSABLE PUNCTURE CLOSURE DEVICE / SUTURE GRASPER: Brand: PMI

## (undated) DEVICE — SUTURE VCRL + SZ 0 L27IN ABSRB VLT L26MM UR-6 5/8 CIR VCP603H

## (undated) DEVICE — PENCIL ES L3M BTTN SWCH S STL HEX LOK BLDE ELECTRD HOLSTER

## (undated) DEVICE — SYRINGE MED 10ML TRNSLUC BRL PLUNG BLK MRK POLYPR CTRL

## (undated) DEVICE — GOWN,AURORA,NONREINF,RAGLAN,XXL,STERILE: Brand: MEDLINE